# Patient Record
Sex: FEMALE | Race: WHITE | Employment: OTHER
[De-identification: names, ages, dates, MRNs, and addresses within clinical notes are randomized per-mention and may not be internally consistent; named-entity substitution may affect disease eponyms.]

---

## 2021-06-25 DIAGNOSIS — Z11.59 ENCOUNTER FOR SCREENING FOR OTHER VIRAL DISEASES: ICD-10-CM

## 2021-07-11 ENCOUNTER — HEALTH MAINTENANCE LETTER (OUTPATIENT)
Age: 86
End: 2021-07-11

## 2021-07-16 ENCOUNTER — LAB (OUTPATIENT)
Dept: URGENT CARE | Facility: URGENT CARE | Age: 86
End: 2021-07-16
Attending: COLON & RECTAL SURGERY
Payer: MEDICARE

## 2021-07-16 DIAGNOSIS — Z11.59 ENCOUNTER FOR SCREENING FOR OTHER VIRAL DISEASES: ICD-10-CM

## 2021-07-16 PROCEDURE — U0005 INFEC AGEN DETEC AMPLI PROBE: HCPCS

## 2021-07-16 PROCEDURE — U0003 INFECTIOUS AGENT DETECTION BY NUCLEIC ACID (DNA OR RNA); SEVERE ACUTE RESPIRATORY SYNDROME CORONAVIRUS 2 (SARS-COV-2) (CORONAVIRUS DISEASE [COVID-19]), AMPLIFIED PROBE TECHNIQUE, MAKING USE OF HIGH THROUGHPUT TECHNOLOGIES AS DESCRIBED BY CMS-2020-01-R: HCPCS

## 2021-07-17 LAB — SARS-COV-2 RNA RESP QL NAA+PROBE: NEGATIVE

## 2021-07-19 ENCOUNTER — HOSPITAL ENCOUNTER (OUTPATIENT)
Facility: CLINIC | Age: 86
Discharge: HOME | End: 2021-07-19
Attending: COLON & RECTAL SURGERY | Admitting: COLON & RECTAL SURGERY
Payer: MEDICARE

## 2021-07-19 VITALS
HEIGHT: 63 IN | BODY MASS INDEX: 30.65 KG/M2 | HEART RATE: 88 BPM | DIASTOLIC BLOOD PRESSURE: 67 MMHG | RESPIRATION RATE: 16 BRPM | SYSTOLIC BLOOD PRESSURE: 107 MMHG | OXYGEN SATURATION: 93 % | WEIGHT: 173 LBS

## 2021-07-19 LAB
COLONOSCOPY: NORMAL
GLUCOSE BLDC GLUCOMTR-MCNC: 225 MG/DL (ref 70–99)

## 2021-07-19 PROCEDURE — 258N000003 HC RX IP 258 OP 636: Performed by: COLON & RECTAL SURGERY

## 2021-07-19 PROCEDURE — 250N000011 HC RX IP 250 OP 636: Performed by: COLON & RECTAL SURGERY

## 2021-07-19 PROCEDURE — G0500 MOD SEDAT ENDO SERVICE >5YRS: HCPCS | Performed by: COLON & RECTAL SURGERY

## 2021-07-19 PROCEDURE — 45378 DIAGNOSTIC COLONOSCOPY: CPT | Performed by: COLON & RECTAL SURGERY

## 2021-07-19 RX ORDER — BIMATOPROST 0.1 MG/ML
SOLUTION/ DROPS OPHTHALMIC
COMMUNITY
Start: 2021-02-03

## 2021-07-19 RX ORDER — ATORVASTATIN CALCIUM 40 MG/1
TABLET, FILM COATED ORAL
COMMUNITY
Start: 2020-11-13

## 2021-07-19 RX ORDER — ONDANSETRON 2 MG/ML
4 INJECTION INTRAMUSCULAR; INTRAVENOUS
Status: DISCONTINUED | OUTPATIENT
Start: 2021-07-19 | End: 2021-07-19 | Stop reason: HOSPADM

## 2021-07-19 RX ORDER — TRIAMTERENE CAPSULES 50 MG/1
CAPSULE ORAL
COMMUNITY
Start: 2020-10-22

## 2021-07-19 RX ORDER — LIDOCAINE 40 MG/G
CREAM TOPICAL
Status: DISCONTINUED | OUTPATIENT
Start: 2021-07-19 | End: 2021-07-19 | Stop reason: HOSPADM

## 2021-07-19 RX ORDER — HYDROCHLOROTHIAZIDE 25 MG/1
TABLET ORAL
COMMUNITY
Start: 2020-10-22

## 2021-07-19 RX ORDER — ALBUTEROL SULFATE 90 UG/1
AEROSOL, METERED RESPIRATORY (INHALATION)
COMMUNITY
Start: 2020-03-19

## 2021-07-19 RX ORDER — INSULIN LISPRO 100 [IU]/ML
INJECTION, SOLUTION INTRAVENOUS; SUBCUTANEOUS
COMMUNITY
Start: 2020-10-06

## 2021-07-19 RX ORDER — SODIUM CHLORIDE 9 MG/ML
INJECTION, SOLUTION INTRAVENOUS CONTINUOUS PRN
Status: COMPLETED | OUTPATIENT
Start: 2021-07-19 | End: 2021-07-19

## 2021-07-19 RX ORDER — IPRATROPIUM BROMIDE AND ALBUTEROL SULFATE 2.5; .5 MG/3ML; MG/3ML
SOLUTION RESPIRATORY (INHALATION)
COMMUNITY
Start: 2021-01-04

## 2021-07-19 RX ORDER — FENTANYL CITRATE 50 UG/ML
INJECTION, SOLUTION INTRAMUSCULAR; INTRAVENOUS PRN
Status: COMPLETED | OUTPATIENT
Start: 2021-07-19 | End: 2021-07-19

## 2021-07-19 RX ORDER — GABAPENTIN 300 MG/1
CAPSULE ORAL
COMMUNITY
Start: 2021-02-19

## 2021-07-19 RX ORDER — ENALAPRIL MALEATE 20 MG/1
TABLET ORAL
COMMUNITY
Start: 2020-11-05

## 2021-07-19 RX ORDER — DULOXETIN HYDROCHLORIDE 20 MG/1
CAPSULE, DELAYED RELEASE ORAL
COMMUNITY
Start: 2020-11-13

## 2021-07-19 RX ADMIN — MIDAZOLAM 1 MG: 1 INJECTION INTRAMUSCULAR; INTRAVENOUS at 10:01

## 2021-07-19 RX ADMIN — SODIUM CHLORIDE 500 ML/HR: 9 INJECTION, SOLUTION INTRAVENOUS at 10:05

## 2021-07-19 RX ADMIN — FENTANYL CITRATE 50 MCG: 50 INJECTION, SOLUTION INTRAMUSCULAR; INTRAVENOUS at 10:05

## 2021-07-19 RX ADMIN — FENTANYL CITRATE 50 MCG: 50 INJECTION, SOLUTION INTRAMUSCULAR; INTRAVENOUS at 10:00

## 2021-07-19 ASSESSMENT — MIFFLIN-ST. JEOR: SCORE: 1200.91

## 2021-07-19 NOTE — H&P
Pre-Endoscopy History and Physical     Mary Garcia MRN# [id number]   YOB: 1935 Age: 85 year old     Date of Procedure: 7/19/2021  Primary care provider: Roel Bourne  Type of Endoscopy: colonoscopy  Reason for Procedure: change in bowel habits  Type of Anesthesia Anticipated: Moderate Sedation    HPI:    Mary is a 85 year old female who will be undergoing the above procedure.      A history and physical has been performed. The patient's medications and allergies have been reviewed. The risks and benefits of the procedure including the risk of bleeding, perforation, and missed lesions as well as the sedation options and risks were discussed with the patient.  All questions were answered and informed consent was obtained.      Allergies   Allergen Reactions     Other Environmental Allergy Shortness Of Breath     Tobacco smoke     Hydrocodone-Acetaminophen Nausea and Vomiting        No current facility-administered medications for this encounter.       Medications Prior to Admission   Medication Sig Dispense Refill Last Dose     albuterol (PROAIR HFA) 108 (90 Base) MCG/ACT inhaler    7/19/2021 at Unknown time     atorvastatin (LIPITOR) 40 MG tablet    Past Week at Unknown time     bimatoprost (LUMIGAN) 0.01 % SOLN    Past Week at Unknown time     DULoxetine (CYMBALTA) 20 MG capsule         enalapril (VASOTEC) 20 MG tablet    Past Week at Unknown time     fluticasone-salmeterol (ADVAIR DISKUS) 250-50 MCG/DOSE inhaler    7/19/2021 at Unknown time     gabapentin (NEURONTIN) 300 MG capsule    7/19/2021 at Unknown time     hydrochlorothiazide (HYDRODIURIL) 25 MG tablet    7/17/2021     insulin glargine (LANTUS SOLOSTAR) 100 UNIT/ML pen    7/16/2021     insulin lispro (HUMALOG KWIKPEN) 100 UNIT/ML (1 unit dial) KWIKPEN    7/18/2021 at Unknown time     ipratropium - albuterol 0.5 mg/2.5 mg/3 mL (DUONEB) 0.5-2.5 (3) MG/3ML neb solution    Past Month at Unknown time     metFORMIN (GLUCOPHAGE) 500 MG  "tablet    2021 at Unknown time     triamterene (DYRENIUM) 50 MG capsule    2021       There is no problem list on file for this patient.       Past Medical History:   Diagnosis Date     COPD (chronic obstructive pulmonary disease) (H)      Diabetes (H)     type 2     Hypertension      Neuropathy         Past Surgical History:   Procedure Laterality Date     ENT SURGERY      tonsils     ENT SURGERY      salivary gland stone     GENITOURINARY SURGERY      benign bladder tumor       Social History     Tobacco Use     Smoking status: Former Smoker     Quit date: 1995     Years since quittin.0     Smokeless tobacco: Never Used   Substance Use Topics     Alcohol use: Yes     Comment: rare       Family History   Problem Relation Age of Onset     Colon Cancer Sister          PHYSICAL EXAM:   /60   Pulse 88   Resp 16   Ht 1.588 m (5' 2.5\")   Wt 78.5 kg (173 lb)   SpO2 98%   BMI 31.14 kg/m   Estimated body mass index is 31.14 kg/m  as calculated from the following:    Height as of this encounter: 1.588 m (5' 2.5\").    Weight as of this encounter: 78.5 kg (173 lb).   Mental status - alert and oriented  RESP: lungs clear  CV: RRR  AIRWAY EXAM: Mallampatti Class II (visualization of the soft palate, fauces, and uvula)    IMPRESSION   ASA Class 3 - Severe systemic disease, but not incapacitating      Signed Electronically by: Guero Downs MD  2021    Colorectal Surgery  [phone] (office)  [phone] (pager)  [email or web address]          "

## 2021-09-05 ENCOUNTER — HEALTH MAINTENANCE LETTER (OUTPATIENT)
Age: 86
End: 2021-09-05

## 2022-08-07 ENCOUNTER — HEALTH MAINTENANCE LETTER (OUTPATIENT)
Age: 87
End: 2022-08-07

## 2022-10-23 ENCOUNTER — HEALTH MAINTENANCE LETTER (OUTPATIENT)
Age: 87
End: 2022-10-23

## 2023-09-02 ENCOUNTER — HEALTH MAINTENANCE LETTER (OUTPATIENT)
Age: 88
End: 2023-09-02

## 2024-04-17 ENCOUNTER — TRANSCRIBE ORDERS (OUTPATIENT)
Dept: OTHER | Age: 89
End: 2024-04-17

## 2024-04-17 DIAGNOSIS — J44.9 COPD, MODERATE (H): Primary | ICD-10-CM

## 2025-09-03 ENCOUNTER — TRANSCRIBE ORDERS (OUTPATIENT)
Dept: OTHER | Age: 89
End: 2025-09-03

## 2025-09-03 DIAGNOSIS — J44.9 CHRONIC OBSTRUCTIVE PULMONARY DISEASE, UNSPECIFIED COPD TYPE (H): Primary | ICD-10-CM

## (undated) RX ORDER — FENTANYL CITRATE 50 UG/ML
INJECTION, SOLUTION INTRAMUSCULAR; INTRAVENOUS
Status: DISPENSED
Start: 2021-07-19